# Patient Record
Sex: MALE | Race: WHITE | Employment: FULL TIME | URBAN - METROPOLITAN AREA
[De-identification: names, ages, dates, MRNs, and addresses within clinical notes are randomized per-mention and may not be internally consistent; named-entity substitution may affect disease eponyms.]

---

## 2021-01-14 ENCOUNTER — TELEPHONE (OUTPATIENT)
Dept: CARDIOLOGY CLINIC | Age: 61
End: 2021-01-14

## 2021-01-14 DIAGNOSIS — R00.2 PALPITATIONS: Primary | ICD-10-CM

## 2021-01-14 NOTE — TELEPHONE ENCOUNTER
Contacted pt at [facility] number. Two patient Identifiers confirmed. Advised pt per Dr Rola Anne. Pt verbalized understanding and given the contact number for central scheduling [phone].

## 2021-01-19 ENCOUNTER — TELEPHONE (OUTPATIENT)
Dept: CARDIOLOGY CLINIC | Age: 61
End: 2021-01-19

## 2021-01-19 DIAGNOSIS — R00.2 PALPITATIONS: Primary | ICD-10-CM

## 2021-01-19 NOTE — TELEPHONE ENCOUNTER
Contacted [facility] spoke with [place]. Two patient Identifiers confirmed. Pt scheduled for 01/22/21 at 0830. Will advise pt.

## 2021-01-21 ENCOUNTER — TELEPHONE (OUTPATIENT)
Dept: CARDIOLOGY CLINIC | Age: 61
End: 2021-01-21

## 2021-01-22 ENCOUNTER — TELEPHONE (OUTPATIENT)
Dept: CARDIOLOGY CLINIC | Age: 61
End: 2021-01-22

## 2021-01-22 ENCOUNTER — OFFICE VISIT (OUTPATIENT)
Dept: CARDIOLOGY CLINIC | Age: 61
End: 2021-01-22
Payer: COMMERCIAL

## 2021-01-22 VITALS
BODY MASS INDEX: 28.36 KG/M2 | HEART RATE: 68 BPM | SYSTOLIC BLOOD PRESSURE: 136 MMHG | OXYGEN SATURATION: 96 % | WEIGHT: 214 LBS | DIASTOLIC BLOOD PRESSURE: 86 MMHG | HEIGHT: 73 IN

## 2021-01-22 DIAGNOSIS — I77.810 AORTIC ROOT DILATION (HCC): ICD-10-CM

## 2021-01-22 DIAGNOSIS — R00.2 PALPITATION: ICD-10-CM

## 2021-01-22 DIAGNOSIS — R00.2 PALPITATION: Primary | ICD-10-CM

## 2021-01-22 PROCEDURE — 99204 OFFICE O/P NEW MOD 45 MIN: CPT | Performed by: INTERNAL MEDICINE

## 2021-01-22 PROCEDURE — 93000 ELECTROCARDIOGRAM COMPLETE: CPT | Performed by: INTERNAL MEDICINE

## 2021-01-22 RX ORDER — EMPAGLIFLOZIN 25 MG/1
TABLET, FILM COATED ORAL
COMMUNITY
Start: 2021-01-19

## 2021-01-22 RX ORDER — ROSUVASTATIN CALCIUM 20 MG/1
20 TABLET, COATED ORAL
Qty: 90 TAB | Refills: 3 | Status: SHIPPED | OUTPATIENT
Start: 2021-01-22 | End: 2021-01-26 | Stop reason: SDUPTHER

## 2021-01-22 RX ORDER — SITAGLIPTIN AND METFORMIN HYDROCHLORIDE 50; 1000 MG/1; MG/1
TABLET, FILM COATED, EXTENDED RELEASE ORAL
COMMUNITY
Start: 2020-12-26

## 2021-01-22 NOTE — LETTER
1/22/2021 Patient: Della Ocasio YOB: 1960 Date of Visit: 1/22/2021 Jayshree Betancourt MD 
[place] [address] [place] [address] [address] [facility] [address] Via Fax: [phone] Dear Jayshree Betancourt MD, Thank you for referring Mr. Nikita Murray to [address] [place] [address] for evaluation. My notes for this consultation are attached. If you have questions, please do not hesitate to call me. I look forward to following your patient along with you. Sincerely, Butch Mukherjee MD

## 2021-01-22 NOTE — PROGRESS NOTES
[facility] presents today for   Chief Complaint   Patient presents with    New Patient     Needs to Establish Care, Dr. Gina Townsend ordered a Nuclear Stress for Palpitations       [facility] preferred language for health care discussion is english/other. Is someone accompanying this pt? no    Is the patient using any DME equipment during [address]? no    Depression Screening:  3 most recent PHQ Screens 1/22/2021   Little interest or pleasure in doing things Not at all   Feeling down, depressed, irritable, or hopeless Not at all   Total Score PHQ 2 0       Learning Assessment:  Learning Assessment 1/22/2021   PRIMARY LEARNER Patient   PRIMARY LANGUAGE ENGLISH   LEARNER PREFERENCE PRIMARY DEMONSTRATION   ANSWERED BY patient   RELATIONSHIP SELF       Abuse Screening:  Abuse Screening Questionnaire 1/22/2021   Do you ever feel afraid of your partner? N   Are you in a relationship with someone who physically or mentally threatens you? N       Fall Risk  Fall Risk Assessment, last 12 mths 1/22/2021   Able to walk? Yes   Fall in past 12 months? 0   Do you feel unsteady? 0   Are you worried about falling 0       Pt currently taking Anticoagulant therapy? no    Coordination of Care:  1. Have you been to the ER, urgent care clinic since your last visit? Hospitalized since your last visit? no    2. Have you seen or consulted any other health care providers outside of the [address] [place] [address] since your last visit? Include any pap smears or colon screening.  no

## 2021-01-22 NOTE — PATIENT INSTRUCTIONS
Switch zocor to crestor 20 mg daily Aspirin 81 mg daily If you have not heard from the central scheduler to schedule your testing in 48 hours, please call [phone].

## 2021-01-22 NOTE — PROGRESS NOTES
[facility]    Mr. Rowdy Beltran is 52-year-old male with a history of diabetes, hyperlipidemia, GERD. Patient is here today for initial cardiac evaluation. He denies any prior history of myocardial infarction or congestive heart failure. Patient recently had calcium scoring of coronary artery at [facility] and it was found to be elevated. He was referred to us for the further management. For last 7 to 10 days patient has been experiencing some dull ache sensation in the chest which he describes as a discomfort. He has been random episode which gets better after burping. There is no nausea, diaphoresis or no radiation of this discomfort. It last for few minutes. He also has experienced some dyspnea with exertion especially moving furniture around the house. These are relatively new symptoms. He denies any lower extremity swelling or any PND. Denies any nausea, vomiting, abdominal pain, fever, chills, sputum production. No hematuria or other bleeding complaints    Past Medical History:   Diagnosis Date    Chronic sinus infection     Diabetes ([facility] [address] 2015    GERD (gastroesophageal reflux disease)     Hyperlipidemia        Review of Systems:  Cardiac symptoms as noted above in HPI. All others negative. Denies fatigue, malaise, skin rash, joint pain, blurring vision, photophobia, neck pain, hemoptysis, chronic cough, nausea, vomiting, hematuria, burning micturition, BRBPR, chronic headaches. Current Outpatient Medications   Medication Sig    Janumet XR 50-1,000 mg TM24 take 2 tablets by mouth once daily    Jardiance 25 mg tablet take 1 tablet by mouth every morning for diabetes    simvastatin (ZOCOR) 40 mg tablet Take  by mouth nightly. Indications: hyperlipidemia    pantoprazole (PROTONIX) 20 mg tablet Take 40 mg by mouth daily.  Indications: GASTROESOPHAGEAL REFLUX     No current facility-administered medications for this visit. History reviewed. No pertinent surgical history. Allergies and Sensitivities:  No Known Allergies    Family History:  Family History   Problem Relation Age of Onset    Diabetes Paternal Aunt     Clotting Disorder Mother     Heart Attack Father     Heart Surgery Brother        Social History:  Social History     Tobacco Use    Smoking status: Never Smoker    Smokeless tobacco: Never Used   Substance Use Topics    Alcohol use: Yes     Alcohol/week: 2.0 standard drinks     Types: 2 Cans of beer per week     Comment: can go several weeks with no alcohol    Drug use: Never     He  reports that he has never smoked. He has never used smokeless tobacco.  He  reports current alcohol use of about 2.0 standard drinks of alcohol per week. Physical Exam:  BP Readings from Last 3 Encounters:   01/22/21 136/86         Pulse Readings from Last 3 Encounters:   01/22/21 68          Wt Readings from Last 3 Encounters:   01/22/21 97.1 kg (214 lb)   10/20/16 92.8 kg (204 lb 9.6 oz)       Constitutional: Oriented to person, place, and time. HENT: Head: Normocephalic and atraumatic. Eyes: Conjunctivae and extraocular motions are normal.   Neck: No JVD present. Carotid bruit is not appreciated. Cardiovascular: Regular rhythm. No murmur, gallop or rubs appreciated  Lung: Breath sounds normal. No respiratory distress. No ronchi or rales appreciated  Abdominal: No tenderness. No rebound and no guarding. Musculoskeletal: There is no lower extremity edema. No cynosis  Lymphadenopathy:  No cervical or supraclavicular adenopathy appriciated. Neurological: No gross motor deficit noted. Skin: No visible skin rash noted. No Ear discharge noted  Psychiatric: Normal mood and affect.    Good distal pulse    LABS:   @No results found for: WBC, WBCLT, HGBPOC, HGB, HGBP, HCTPOC, HCT, PHCT, RBCH, PLT, MCV, HGBEXT, HCTEXT, PLTEXT  No results found for: NA, K, CL, CO2, GLU, BUN, CREA  No flowsheet data found. No results found for: ALT  No results found for: HBA1C, HGBE8, JPU3DTZP, GXB5ONDP  No results found for: TSH, TSH2, TSH3, TSHP, TSHEXT    EKG  Sinus rhythm. No ST-T changes of ischemia. Calcium Score (Agatston):12/20  LM:    005  LAD:    250  LCx:    025  RCA:    130  ----------------------  Total:    410    ECHO    STRESS TEST (EST, PHARM, NUC, ECHO etc)    CATHETERIZATION    IMPRESSION & PLAN:  Mr. Samuel Quintana is 25-year-old male with diabetes, hyperlipidemia    Chest pain/dyspnea:  Patient has been experiencing some dyspnea on exertion as mentioned in HPI. For last 1 week he has occasional dull discomfort as well  Patient had calcium score last month which was elevated  Also has risk factors  Patient is scheduled to have nuclear stress test today and I agree with that plan. We also discussed about potential role of invasive cardiac evaluation with left heart catheterization. He would like to proceed with nuclear stress test  Start aspirin 81 mg daily  Have advised patient to avoid any exertional activities  Order echo to rule out pulmonary hypertension or any LV dysfunction with symptoms    Aortic dilation:  Patient's CAT scan in 12/2020 showed aortic root of 4.1 cm and ascending aorta of 4.5 cm. It was noncontrast study  Will obtain CT chest with contrast to better evaluate aorta  Depending on the CT finding, will consider patient to start on beta-blocker or ARB    Hyperlipidemia:  Currently he is on simvastatin 40 mg daily. Will obtain fasting lipid profile from PCP or will order new  Will discontinue simvastatin and use high intensity Crestor 20 mg daily and uptitrate if needed especially with recent CT showing elevated calcium score. Discussed with the patient    Diabetes:  Goal hemoglobin A1c less than 7 is recommended. According to patient last hemoglobin A1c was 8. Currently on appropriate medication.   Defer management to PCP    This plan was discussed with patient who is in agreement. Thank you for allowing me to participate in patient care. Please feel free to call me if you have any question or concern. Trang Schneider MD  Please note: This document has been produced using voice recognition software. Unrecognized errors in transcription may be present.

## 2021-01-22 NOTE — TELEPHONE ENCOUNTER
Spoke to Dr. Caitlin Rosario on 1/21/21 requesting his patient's medical history. Dr. Caitlin Rosario said he ordered the nuclear stress test due to the patient having a high calcium score, hypertension, and an abnormal ekg.

## 2021-01-25 LAB
ECHO AO ROOT DIAM: 3.66 CM
ECHO LA AREA 4C: 14.8 CM2
ECHO LA VOL 2C: 37.94 ML (ref 18–58)
ECHO LA VOL 4C: 31.2 ML (ref 18–58)
ECHO LA VOL BP: 37.65 ML (ref 18–58)
ECHO LA VOL/BSA BIPLANE: 17 ML/M2 (ref 16–28)
ECHO LA VOLUME INDEX A2C: 17.13 ML/M2 (ref 16–28)
ECHO LA VOLUME INDEX A4C: 14.09 ML/M2 (ref 16–28)
ECHO LV E' LATERAL VELOCITY: 8.45 CM/S
ECHO LV E' SEPTAL VELOCITY: 7.51 CM/S
ECHO LV INTERNAL DIMENSION DIASTOLIC: 5.27 CM (ref 4.2–5.9)
ECHO LV INTERNAL DIMENSION SYSTOLIC: 3.49 CM
ECHO LV ISOVOLUMETRIC RELAXATION TIME (IVRT): 110.37 MS
ECHO LV ISOVOLUMETRIC RELAXATION TIME (IVRT): 93.24 MS
ECHO LV IVSD: 0.89 CM (ref 0.6–1)
ECHO LV MASS 2D: 186.8 G (ref 88–224)
ECHO LV MASS INDEX 2D: 84.3 G/M2 (ref 49–115)
ECHO LV POSTERIOR WALL DIASTOLIC: 1.02 CM (ref 0.6–1)
ECHO LVOT DIAM: 2.13 CM
ECHO LVOT PEAK GRADIENT: 3.86 MMHG
ECHO LVOT PEAK VELOCITY: 98.19 CM/S
ECHO LVOT SV: 71.5 ML
ECHO LVOT VTI: 20.13 CM
ECHO MV A VELOCITY: 73.59 CM/S
ECHO MV E DECELERATION TIME (DT): 273.04 MS
ECHO MV E VELOCITY: 47.07 CM/S
ECHO MV E/A RATIO: 0.64
ECHO MV E/E' LATERAL: 5.57
ECHO MV E/E' RATIO (AVERAGED): 5.92
ECHO MV E/E' SEPTAL: 6.27
ECHO PVEIN A DURATION: 117.98 MS
ECHO PVEIN A VELOCITY: 22.37 CM/S
ECHO RV TAPSE: 2.46 CM (ref 1.5–2)
LVOT MG: 1.82 MMHG

## 2021-01-26 ENCOUNTER — TELEPHONE (OUTPATIENT)
Dept: CARDIOLOGY CLINIC | Age: 61
End: 2021-01-26

## 2021-01-26 NOTE — TELEPHONE ENCOUNTER
Attempted to contact pt at /cell number, no answer. Lvm for pt to return call to office at [phone]. Will continue to try to contact pt.

## 2021-01-26 NOTE — TELEPHONE ENCOUNTER
Verbal order and read back per Luis Watson MD  Echo and Nuclear stress normal; would like to see pt after cta

## 2021-01-26 NOTE — TELEPHONE ENCOUNTER
Contacted pt at [facility] number. Two patient Identifiers confirmed. Advised pt per Dr Lucy Desai. Pt verbalized understanding and stated he will call after he has his appt scheduled but would like info sent to [facility].

## 2021-01-26 NOTE — TELEPHONE ENCOUNTER
PCP: Brad Guillen MD    Last appt: Visit date not found  Future Appointments   Date Time Provider Haleigh Rivera   1/26/2021  2:00 PM Kelsey Floyd MD [facility]       Requested Prescriptions     Pending Prescriptions Disp Refills    rosuvastatin (CRESTOR) 20 mg tablet 90 Tab 3     Sig: Take 1 Tab by mouth nightly. Other Comments:Pharmacy changed to local pharmacy.

## 2021-01-30 RX ORDER — ROSUVASTATIN CALCIUM 20 MG/1
20 TABLET, COATED ORAL
Qty: 90 TAB | Refills: 3 | Status: SHIPPED | OUTPATIENT
Start: 2021-01-30 | End: 2022-03-27

## 2021-02-02 ENCOUNTER — TELEPHONE (OUTPATIENT)
Dept: CARDIOLOGY CLINIC | Age: 61
End: 2021-02-02

## 2021-02-02 NOTE — TELEPHONE ENCOUNTER
Patient called to verify the CT Chest w Contrast was the correct imaging order. Patient was concerned that order was not specifically for the Heart.

## 2021-02-03 ENCOUNTER — HOSPITAL ENCOUNTER (OUTPATIENT)
Dept: CT IMAGING | Age: 61
Discharge: HOME | End: 2021-02-03
Attending: INTERNAL MEDICINE
Payer: COMMERCIAL

## 2021-02-03 LAB — CREAT UR-MCNC: 1.1 MG/DL (ref 0.6–1.3)

## 2021-02-03 PROCEDURE — 82565 ASSAY OF CREATININE: CPT

## 2021-02-03 PROCEDURE — 74011000636 HC RX REV CODE- 636: Performed by: INTERNAL MEDICINE

## 2021-02-03 PROCEDURE — 71260 CT THORAX DX C+: CPT

## 2021-02-03 RX ADMIN — IOPAMIDOL 81 ML: 612 INJECTION, SOLUTION INTRAVENOUS at 15:45

## 2021-02-03 NOTE — TELEPHONE ENCOUNTER
Contacted pt at [facility] number. Two patient Identifiers confirmed. Advised pt CT was correct order. Pt verbalized understanding.

## 2021-04-19 ENCOUNTER — OFFICE VISIT (OUTPATIENT)
Dept: CARDIOLOGY CLINIC | Age: 61
End: 2021-04-19
Payer: COMMERCIAL

## 2021-04-19 VITALS
HEIGHT: 73 IN | HEART RATE: 82 BPM | BODY MASS INDEX: 27.7 KG/M2 | SYSTOLIC BLOOD PRESSURE: 108 MMHG | DIASTOLIC BLOOD PRESSURE: 78 MMHG | WEIGHT: 209 LBS | OXYGEN SATURATION: 98 %

## 2021-04-19 DIAGNOSIS — I77.810 AORTIC ROOT DILATION (HCC): Primary | ICD-10-CM

## 2021-04-19 PROCEDURE — 99213 OFFICE O/P EST LOW 20 MIN: CPT | Performed by: INTERNAL MEDICINE

## 2021-04-19 NOTE — PROGRESS NOTES
[facility] presents today for   Chief Complaint   Patient presents with    Follow-up     follow up after testing        [facility]an [facility]n preferred language for health care discussion is english/other. Is someone accompanying this pt? no    Is the patient using any DME equipment during [address]? no    Depression Screening:  3 most recent PHQ Screens 4/19/2021   Little interest or pleasure in doing things Not at all   Feeling down, depressed, irritable, or hopeless Not at all   Total Score PHQ 2 0       Learning Assessment:  Learning Assessment 1/22/2021   PRIMARY LEARNER Patient   PRIMARY LANGUAGE ENGLISH   LEARNER PREFERENCE PRIMARY DEMONSTRATION   ANSWERED BY patient   RELATIONSHIP SELF       Abuse Screening:  Abuse Screening Questionnaire 4/19/2021   Do you ever feel afraid of your partner? N   Are you in a relationship with someone who physically or mentally threatens you? N   Is it safe for you to go home? Y       Fall Risk  Fall Risk Assessment, last 12 mths 1/22/2021   Able to walk? Yes   Fall in past 12 months? 0   Do you feel unsteady? 0   Are you worried about falling 0       Pt currently taking Anticoagulant therapy? no    Coordination of Care:  1. Have you been to the ER, urgent care clinic since your last visit? Hospitalized since your last visit? no    2. Have you seen or consulted any other health care providers outside of the [address] [place] [address] since your last visit? Include any pap smears or colon screening.  no

## 2021-04-25 NOTE — PROGRESS NOTES
[facility]    Mr. Rosy Back is a 71-year-old man with a history of diabetes, hyperlipidemia, GERD. Patient is here today for follow-up of recent cardiac testing. He has no history of myocardial infarction or congestive heart failure. Patient recently had calcium scoring of coronary artery at [facility] and it was found to be elevated. He was referred to us for the further management. When he was initially seen on 1/22/2021, he had  been experiencing some dull ache sensation in the chest which he described as a discomfort. The discomfort occurred randomly and improved after belching. There was no nausea, diaphoresis or no radiation of this discomfort. It lasted for few minutes. He also  experienced some dyspnea with exertion especially moving furniture around the house. These seemed to be relatively new symptoms. He denied any lower extremity swelling or any PND. Past Medical History:   Diagnosis Date    Chronic sinus infection     Diabetes ([facility] [address] 2015    GERD (gastroesophageal reflux disease)     Hyperlipidemia        Review of Systems:  Cardiac symptoms as noted above in HPI. All others negative. Denies fatigue, malaise, skin rash, joint pain, blurring vision, photophobia, neck pain, hemoptysis, chronic cough, nausea, vomiting, hematuria, burning micturition, BRBPR, chronic headaches. Current Outpatient Medications   Medication Sig    rosuvastatin (CRESTOR) 20 mg tablet Take 1 Tab by mouth nightly.  Janumet XR 50-1,000 mg TM24 take 2 tablets by mouth once daily    Jardiance 25 mg tablet take 1 tablet by mouth every morning for diabetes    pantoprazole (PROTONIX) 20 mg tablet Take 40 mg by mouth daily. Indications: GASTROESOPHAGEAL REFLUX     No current facility-administered medications for this visit. No past surgical history on file.     Allergies and Sensitivities:  No Known Allergies    Family History:  Family History   Problem Relation Age of Onset    Diabetes Paternal Aunt     Clotting Disorder Mother     Heart Attack Father     Heart Surgery Brother        Social History:  Social History     Tobacco Use    Smoking status: Never Smoker    Smokeless tobacco: Never Used   Substance Use Topics    Alcohol use: Yes     Alcohol/week: 2.0 standard drinks     Types: 2 Cans of beer per week     Comment: can go several weeks with no alcohol    Drug use: Never     He  reports that he has never smoked. He has never used smokeless tobacco.  He  reports current alcohol use of about 2.0 standard drinks of alcohol per week. Physical Exam:  BP Readings from Last 3 Encounters:   04/19/21 108/78   01/25/21 (!) 140/80   01/22/21 136/86         Pulse Readings from Last 3 Encounters:   04/19/21 82   01/22/21 68          Wt Readings from Last 3 Encounters:   04/19/21 209 lb (94.8 kg)   01/25/21 214 lb (97.1 kg)   01/22/21 214 lb (97.1 kg)       Constitutional: Oriented to person, place, and time. HENT: Head: Normocephalic and atraumatic. Eyes: Conjunctivae and extraocular motions are normal.   Neck: No JVD present. Carotid bruit is not appreciated. Cardiovascular: Regular rhythm. No murmur, gallop or rubs appreciated  Lung: Breath sounds normal. No respiratory distress. No ronchi or rales appreciated  Abdominal: No tenderness. No rebound and no guarding. Musculoskeletal: There is no lower extremity edema. No cynosis  Lymphadenopathy:  No cervical or supraclavicular adenopathy appriciated. Neurological: No gross motor deficit noted. Skin: No visible skin rash noted. No Ear discharge noted  Psychiatric: Normal mood and affect. Good distal pulse    LABS:   @No results found for: WBC, WBCLT, HGBPOC, HGB, HGBP, HCTPOC, HCT, PHCT, RBCH, PLT, MCV, HGBEXT, HCTEXT, PLTEXT, HGBEXT, HCTEXT, PLTEXT  No results found for: NA, K, CL, CO2, GLU, BUN, CREA  No flowsheet data found.   No results found for: ALT  No results found for: HBA1C, HGBE8, MST9PZQT, MWR7TFRK, ASI6AKJR  No results found for: TSH, TSH2, TSH3, TSHP, TSHEXT, TSHEXT    EKG  Sinus rhythm. No ST-T changes of ischemia. Calcium Score (Agatston):12/20  LM:    005  LAD:    250  LCx:    025  RCA:    130  ----------------------  Total:    410    ECHO    STRESS TEST (EST, PHARM, NUC, ECHO etc)    CATHETERIZATION    IMPRESSION & PLAN:  Mr. Nilson Sheets is 52-year-old male with diabetes, hyperlipidemia    Chest pain/dyspnea:  Patient has been experiencing some dyspnea on exertion as mentioned in HPI. For last 1 week he has occasional dull discomfort as well  Patient had calcium score last month which was elevated    Nuclear stress test was ordered and completed on 1/25/2021. Myocardial perfusion imaging supports a low risk stress test.  Ejection fraction was 56%. Continue aspirin 81 mg daily    Echocardiogram was ordered and completed on 1/25/2021. Normal ventricular chamber size and function. Ejection fraction 55 to 60%. No sniffing of valvular pathology. Aortic root measured 4 cm. Aortic dilation:  Patient's CT scan in 12/2020 showed aortic root of 4.1 cm and ascending aorta of 4.5 cm. It was a noncontrast study  CT with contrast was done on 2/3/2021. Ectatic ascending thoracic aorta measures 4.4 cm in diameter ( top limit of normal size). Goal systolic pressure is 253 to 120 mmHg. Hyperlipidemia:  He was on simvastatin 40 mg daily. After findings of elevated calcium scoring,  He is now on Crestor 20 mg daily. He will need fasting lipid profile    Diabetes:  Goal hemoglobin A1c less than 7 is recommended. According to patient last hemoglobin A1c was 8. Currently on appropriate medication.   Defer management to PCP    Follow-up in 6 months        Aydin Fang MD

## 2021-10-19 ENCOUNTER — OFFICE VISIT (OUTPATIENT)
Dept: CARDIOLOGY CLINIC | Age: 61
End: 2021-10-19
Payer: COMMERCIAL

## 2021-10-19 VITALS
HEIGHT: 73 IN | OXYGEN SATURATION: 95 % | WEIGHT: 206 LBS | HEART RATE: 75 BPM | DIASTOLIC BLOOD PRESSURE: 80 MMHG | SYSTOLIC BLOOD PRESSURE: 132 MMHG | BODY MASS INDEX: 27.3 KG/M2

## 2021-10-19 DIAGNOSIS — I77.810 AORTIC ROOT DILATION (HCC): Primary | ICD-10-CM

## 2021-10-19 PROCEDURE — 99212 OFFICE O/P EST SF 10 MIN: CPT | Performed by: INTERNAL MEDICINE

## 2021-10-19 RX ORDER — ASPIRIN 81 MG/1
TABLET ORAL DAILY
COMMUNITY

## 2021-10-19 NOTE — PROGRESS NOTES
Aide Shipman presents today for   Chief Complaint   Patient presents with   [address] [place] [address] preferred language for health care discussion is english/other. Is someone accompanying this pt? no    Is the patient using any DME equipment during [address]? no    Depression Screening:  3 most recent PHQ Screens 10/19/2021   Little interest or pleasure in doing things Not at all   Feeling down, depressed, irritable, or hopeless Not at all   Total Score PHQ 2 0       Learning Assessment:  Learning Assessment 1/22/2021   PRIMARY LEARNER Patient   PRIMARY LANGUAGE ENGLISH   LEARNER PREFERENCE PRIMARY DEMONSTRATION   ANSWERED BY patient   RELATIONSHIP SELF       Abuse Screening:  Abuse Screening Questionnaire 4/19/2021   Do you ever feel afraid of your partner? N   Are you in a relationship with someone who physically or mentally threatens you? N   Is it safe for you to go home? Y       Fall Risk  Fall Risk Assessment, last 12 mths 1/22/2021   Able to walk? Yes   Fall in past 12 months? 0   Do you feel unsteady? 0   Are you worried about falling 0       Pt currently taking Anticoagulant therapy? no    Coordination of Care:  1. Have you been to the ER, urgent care clinic since your last visit? Hospitalized since your last visit? no    2. Have you seen or consulted any other health care providers outside of the [address] [place] [address] since your last visit? Include any pap smears or colon screening.  no

## 2021-10-25 NOTE — PROGRESS NOTES
[facility]    Mr. Wolfgang Alicia is a 23-year-old man with a history of diabetes, hyperlipidemia, GERD. Patient is here today for follow-up of recent cardiac testing. He has no history of myocardial infarction or congestive heart failure. He had a calcium scoring at [facility] which was found to be elevated. He was referred to cardiology for the further management. When he was initially seen on 1/22/2021, he was experiencing a dull ache sensation in the chest which he described as a discomfort. The discomfort occurred randomly and improved after belching. There was no nausea, diaphoresis or  radiation of this discomfort. It lasted for few minutes. He also  experienced  dyspnea with increased exertion exertion as for an example moving furniture around the house. Those symptoms seemed to be relatively new symptoms. He denied any lower extremity swelling or any PND. He subsequently had a normal nuclear stress test.    In the office today, he reports that he has adjusted his diet   for last couple of months after having an elevated hemoglobin A1c. He has had no chest pain or shortness of breath. He had a recent lipid profile which is not presently available done through Dr. Andres Lyon. Past Medical History:   Diagnosis Date    Chronic sinus infection     Diabetes ([facility] [address] 2015    GERD (gastroesophageal reflux disease)     Hyperlipidemia        Review of Systems:  Cardiac symptoms as noted above in HPI. All others negative. Denies fatigue, malaise, skin rash, joint pain, blurring vision, photophobia, neck pain, hemoptysis, chronic cough, nausea, vomiting, hematuria, burning micturition, BRBPR, chronic headaches. Current Outpatient Medications   Medication Sig    aspirin delayed-release 81 mg tablet Take  by mouth daily.  rosuvastatin (CRESTOR) 20 mg tablet Take 1 Tab by mouth nightly.     Janumet XR 50-1,000 mg TM24 take 2 tablets by mouth once daily    Jardiance 25 mg tablet take 1 tablet by mouth every morning for diabetes    pantoprazole (PROTONIX) 20 mg tablet Take 40 mg by mouth daily. Indications: GASTROESOPHAGEAL REFLUX     No current facility-administered medications for this visit. No past surgical history on file. Allergies and Sensitivities:  No Known Allergies    Family History:  Family History   Problem Relation Age of Onset    Diabetes Paternal Aunt     Clotting Disorder Mother     Heart Attack Father     Heart Surgery Brother        Social History:  Social History     Tobacco Use    Smoking status: Never Smoker    Smokeless tobacco: Never Used   Substance Use Topics    Alcohol use: Yes     Alcohol/week: 2.0 standard drinks     Types: 2 Cans of beer per week     Comment: can go several weeks with no alcohol    Drug use: Never     He  reports that he has never smoked. He has never used smokeless tobacco.  He  reports current alcohol use of about 2.0 standard drinks of alcohol per week. Physical Exam:  BP Readings from Last 3 Encounters:   10/19/21 132/80   04/19/21 108/78   01/25/21 (!) 140/80         Pulse Readings from Last 3 Encounters:   10/19/21 75   04/19/21 82   01/22/21 68          Wt Readings from Last 3 Encounters:   10/19/21 93.4 kg (206 lb)   04/19/21 94.8 kg (209 lb)   01/25/21 97.1 kg (214 lb)       Constitutional: Oriented to person, place, and time. HENT: Head: Normocephalic and atraumatic. Eyes: Conjunctivae and extraocular motions are normal.   Neck: No JVD present. Carotid bruit is not appreciated. Cardiovascular: Regular rhythm. No murmur, gallop or rubs appreciated  Lung: Breath sounds normal. No respiratory distress. No ronchi or rales appreciated  Abdominal: No tenderness. No rebound and no guarding. Musculoskeletal: There is no lower extremity edema. No cynosis  Lymphadenopathy:  No cervical or supraclavicular adenopathy appriciated.    Neurological: No gross motor deficit noted. Skin: No visible skin rash noted. No Ear discharge noted  Psychiatric: Normal mood and affect. Good distal pulse    LABS:   @No results found for: WBC, WBCLT, HGBPOC, HGB, HGBP, HCTPOC, HCT, PHCT, RBCH, PLT, MCV, HGBEXT, HCTEXT, PLTEXT, HGBEXT, HCTEXT, PLTEXT  No results found for: NA, K, CL, CO2, GLU, BUN, CREA  No flowsheet data found. No results found for: ALT  No results found for: HBA1C, JVN4ZVXE, QQD2PDKN, UTY8WTWZ  No results found for: TSH, TSH2, TSH3, TSHP, TSHEXT, TSHEXT    EKG  Sinus rhythm. No ST-T changes of ischemia. Calcium Score (Agatston):12/20  LM:    005  LAD:    250  LCx:    025  RCA:    130  ----------------------  Total:    410    ECHO    STRESS TEST (EST, PHARM, NUC, ECHO etc)    CATHETERIZATION    IMPRESSION & PLAN:  Mr. Laney Runner is 20-year-old male with diabetes, hyperlipidemia and a family history of CAD    Chest pain/dyspnea:  Patient was experiencing  dyspnea on exertion as mentioned in HPI. Those symptoms seem to have improved. Patient had calcium score  which was elevated    Nuclear stress test was ordered and completed on 1/25/2021. Myocardial perfusion imaging supports a low risk stress test.  Ejection fraction was 56%. Continue Crestor 20 mg daily    Echocardiogram was ordered and completed on 1/25/2021. Normal ventricular chamber size and function. Ejection fraction 55 to 60%. No significant valvular pathology. Aortic root measured 4 cm. Aortic dilation:  Patient's CT scan in 12/2020 showed aortic root of 4.1 cm and ascending aorta of 4.5 cm. It was a noncontrast study  CT with contrast was done on 2/3/2021. Ectatic ascending thoracic aorta measures 4.4 cm in diameter ( top limit of normal size). Goal systolic pressure is 868 to 120 mmHg. We will plan to repeat scan in early 2022    Hyperlipidemia:  He was on simvastatin 40 mg daily. After findings of elevated calcium scoring,  He is now on Crestor 20 mg daily.   He reports he had a more recent lipid profile done through his PCP. He will retrieve the results and call me with the values. Diabetes:  Goal hemoglobin A1c less than 7 is recommended. According to patient last hemoglobin A1c was > 8. Currently on appropriate medication. Defer management to PCP    Tremor he has other family members with tremor. 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 Offered trial of beta-blockers.         Garrick Pederson MD

## 2022-03-27 RX ORDER — ROSUVASTATIN CALCIUM 20 MG/1
TABLET, COATED ORAL
Qty: 90 TABLET | Refills: 3 | Status: SHIPPED | OUTPATIENT
Start: 2022-03-27

## 2023-05-12 RX ORDER — ROSUVASTATIN CALCIUM 20 MG/1
TABLET, COATED ORAL
Qty: 90 TABLET | OUTPATIENT
Start: 2023-05-12

## 2023-06-14 RX ORDER — ROSUVASTATIN CALCIUM 20 MG/1
TABLET, COATED ORAL
Qty: 90 TABLET | OUTPATIENT
Start: 2023-06-14